# Patient Record
(demographics unavailable — no encounter records)

---

## 2024-10-24 NOTE — ASSESSMENT
[FreeTextEntry1] : Kayy is a 5-year-old previously healthy female presenting for hospital follow-up after being admitted to [facility] from 8/6 to 8/10 for respiratory failure in the setting of pneumonia and rhinovirus/enterovirus infection. She was treated with amoxicillin, azithromycin, and prednisolone during her hospitalization. She was also prescribed Fluticasone Propionate 44 mcg 2 puffs BID, which was continued for a month post-hospitalization and then discontinued.  At today's visit, her cough has completely resolved, and her lung exam and oxygen saturation were normal. The mother is unsure of the specific details of the pneumonia but will bring records from the outside hospital at the next visit.  I discussed with the mother that, given the absence of risk factors such as a family history of asthma, eczema, or allergies, as well as no prior history of cough, wheezing, or exercise-related symptoms, it is unlikely that her hospitalization was related to underlying asthma. Her respiratory distress at that time was likely related to viral bronchiolitis. Rhino/enterovirus is known to cause airway reactivity in some patients, and if she develops cough or wheezing requiring frequent albuterol use, she can restart Fluticasone Propionate. However, in the absence of symptoms, it is not indicated at this time. I recommended obtaining a repeat chest X-ray to follow up on her recent pneumonia.   PLAN:  - Reviewed indications for albuterol use - If patient requires albuterol frequently, restart Fluticasone Propionate for the winter season - Repeat Chest Xray - F/U as needed - Mother to have pediatrician's office fax over xray results from outside hospital.   Discussed above assessment, management plan, potential medication side effects and test results. Parent agreed with plan. All queries were answered. Patients evaluation include normal saturation. Time excludes separately reported services.

## 2024-10-24 NOTE — HISTORY OF PRESENT ILLNESS
[FreeTextEntry1] : Kayy is 5-year-old previously healthy female presenting for hospital follow-up after being admitted to [facility] from  to 8/10 for respiratory failure in the setting of pneumonia. She required oxygen supplementation during her hospitalization. Respiratory viral panel (RVP) was positive for rhinovirus/enterovirus. She was treated with amoxicillin and azithromycin, and albuterol was administered during the hospitalization with noted improvement in her respiratory status. She was also prescribed a 5 day course of prednisolone. Fluticasone Propionate 44 mcg 2 puffs BID was prescribed, which was discontinued after one month. Asthma action plan was also provided.   Follow-up to assess her current respiratory status and recovery post-hospitalization.  She was sick with URI symptoms 3 weeks ago. her cough has completely resolved now.   No repeat imaging has been done.  No family h/o asthma, allergies , eczema. Brother has environmental allergies  No prior diagnosis of asthma  No pets at home.  No smokers.  No prior hospitalization.  H/o albuterol use few years ago only for a respiratory infection one time.    Born FT, , in [place] ( [facility]) No respiratory complications at birth

## 2024-10-24 NOTE — END OF VISIT
[] : Fellow [Time Spent: ___ minutes] : I have spent [unfilled] minutes of time on the encounter which excludes teaching and separately reported services. [FreeTextEntry3] : I have seen and examined the patient. I have reviewed the history with Dr. Solis. No risk factors for asthma.  No confounders of airway disease such as sleep disorder breathing, JOSE or persistent postnasal drip. Discussed likely viral bronchiolitis that has resolved. Previous pneumonia likely treated as Mycoplasma pneumonia; we do not have copies of the CXRs. Discussed repeat CXR as "baseline", indications for albuterol and for that matter, inhaled steroids that likely can be given intermittently such that frequent use will inform 1) decision for daily use like for example the wintertime, 2) follow up in PUlmonary, 3) follow trajectory towards asthma.

## 2024-10-24 NOTE — HISTORY OF PRESENT ILLNESS
[FreeTextEntry1] : Kayy is 5-year-old previously healthy female presenting for hospital follow-up after being admitted to [facility] from  to 8/10 for respiratory failure in the setting of pneumonia. She required oxygen supplementation during her hospitalization. Respiratory viral panel (RVP) was positive for rhinovirus/enterovirus. She was treated with amoxicillin and azithromycin, and albuterol was administered during the hospitalization with noted improvement in her respiratory status. She was also prescribed a 5 day course of prednisolone. Fluticasone Propionate 44 mcg 2 puffs BID was prescribed, which was discontinued after one month. Asthma action plan was also provided.   Follow-up to assess her current respiratory status and recovery post-hospitalization.  She was sick with URI symptoms 3 weeks ago. her cough has completely resolved now.   No repeat imaging has been done.  No family h/o asthma, allergies , eczema. Brother has environmental allergies  No prior diagnosis of asthma  No pets at home.  No smokers.  No prior hospitalization.  H/o albuterol use few years ago only for a respiratory infection one time.    Born FT, , in [place] ( [facility]) No respiratory complications at birth

## 2024-10-24 NOTE — REASON FOR VISIT
[Initial Consultation] : an initial consultation for [F/U - Hospitalization] : follow-up of a recent hospitalization for [Mother] : mother [FreeTextEntry3] : + pneumonia

## 2024-10-24 NOTE — REASON FOR VISIT
Patient Name: Bernardo Fair   MRN: [id number]    Rupert Forbes is a 21 y.o. female who presents with the following: here with mother. Patient states that she has had recurrent falls over the past month due to leg weakness and numbness. States that symptoms began about a month ago when she woke up and could not feel her legs moving. Subsequently went to the ER where they did a CT scan of her head that showed possible colloid cyst and was told that it may be a brain tumor. She subsequently saw Dr. Francesca Suggs who did an MRI of the brain which was normal and prior finding was thought to be due to an artifact. She has also undergone an extensive neurological evaluation with Dr. Chay Echevarria including a normal EMG. She continues to have almost daily falls due to sudden weakness of her legs. Denies any preceding symptoms such as lightheadedness, chest pain, palpitations, vision changes. She does have a history of back surgery as a child but has not seen a spine orthopedic since becoming an adult. Not currently driving. She continues to have frequent migraines and believes that the Imitrex is no longer working for her but is taking frequent doses of ibuprofen. She does have a history of hypothyroidism but is not very compliant with her levothyroxine to 50 mg daily. Has consistently been taking her meds for the past month. Mother states that she was under stress about 5 years ago and was diagnosed with conversion disorder by a psychiatrist.  She currently denies any increased stress recently and has been doing well from an anxiety standpoint on her Cymbalta dose. Review of Systems   Constitutional: Negative for fever, malaise/fatigue and weight loss. Respiratory: Negative for cough, hemoptysis, shortness of breath and wheezing. Cardiovascular: Negative for chest pain, palpitations, leg swelling and PND.    Gastrointestinal: Negative for abdominal pain, constipation, diarrhea, nausea and vomiting. Musculoskeletal: Positive for falls. Neurological: Positive for tingling, sensory change and headaches. Negative for tremors, speech change, seizures and loss of consciousness. The patient's medications, allergies, past medical history, surgical history, family history and social history were reviewed and updated where appropriate. Prior to Admission medications    Medication Sig Start Date End Date Taking? Authorizing Provider   SUMAtriptan (IMITREX) 25 mg tablet Take 1 Tab by mouth as needed for Migraine for up to 1 dose. 2/26/19  Yes Jerrod Rivera MD   levothyroxine (SYNTHROID) 200 mcg tablet TAKE 1 TABLET BY MOUTH ONCE DAILY ALONG WITH 50 MCG LEVOTHYROXINE TABLET 1/31/19  Yes Vasu Chavez MD   DULoxetine (CYMBALTA) 60 mg capsule TAKE ONE CAPSULE BY MOUTH DAILY 5/4/18  Yes Jerrod Rivera MD   montelukast (SINGULAIR) 10 mg tablet TAKE ONE TABLET BY MOUTH DAILY 4/5/18  Yes Jerrod Rivera MD   levothyroxine (SYNTHROID) 50 mcg tablet TAKE ONE TABLET BY MOUTH ONCE DAILY ALONG WITH 200 MCG TABLET 2/12/18  Yes Jerrod Rivera MD   NUVARING 0.12-0.015 mg/24 hr vaginal ring INSERT VAGINALY X 3 WEEKS, REMOVE FOR 1 WEEK, THEN REPEAT 6/8/17  Yes Provider, Historical   cholecalciferol, vitamin D3, (VITAMIN D3) 2,000 unit tab Take 2,000 Units by mouth daily. Yes Provider, Historical   FERROUS FUMARATE (IRON PO) Take  by mouth daily. Yes Provider, Historical   EPIPEN 2-JOSE 0.3 mg/0.3 mL injection  5/6/16  Yes Provider, Historical   fexofenadine (ALLEGRA) 180 mg tablet Take 180 mg by mouth daily as needed.    Yes Provider, Historical       Allergies   Allergen Reactions    Hazelnut Anaphylaxis    Adhesive Tape-Silicones Itching     Paper tape    [place] Swelling    Grass Pollen Unknown (comments)     + testing           OBJECTIVE    Visit Vitals  /78 (BP 1 Location: Left arm, BP Patient Position: Sitting)   Pulse 95   Temp 98.4 °F (36.9 °C) (Oral)   Resp 18   Ht 5' 7.5\" (1.715 m)   Wt 329 lb 6.4 oz (149.4 kg)   LMP 03/12/2019 (Exact Date)   SpO2 98%   BMI 50.83 kg/m²       Physical Exam   Constitutional: She is oriented to person, place, and time and well-developed, well-nourished, and in no distress. No distress. Eyes: Conjunctivae and EOM are normal. Pupils are equal, round, and reactive to light. Neck: Normal range of motion. Neck supple. No thyromegaly present. Cardiovascular: Normal rate, regular rhythm and normal heart sounds. Exam reveals no gallop and no friction rub. No murmur heard. Pulmonary/Chest: Effort normal and breath sounds normal. No respiratory distress. She has no wheezes. Neurological: She is alert and oriented to person, place, and time. Skin: Skin is warm and dry. No rash noted. She is not diaphoretic. Psychiatric: Mood, memory, affect and judgment normal.   Nursing note and vitals reviewed. ASSESSMENT AND PLAN  Mason Lea is a 21 y.o. female who presents today for:    1. Acquired hypothyroidism  Stable, continue current treatment pending review of labs. Strongly encouraged her to be compliant with her medications as uncontrolled thyroid disorders may contribute to neuropathy.  - TSH AND FREE T4  - T3, FREE  - THYROID ANTIBODY PANEL    2. Vitamin D deficiency  Stable, continue current treatment pending review of labs. - VITAMIN D, 25 HYDROXY    3. Neuropathy  Recent EMG was within normal limits. Discussed that she should have her back reevaluated given her history of back surgery and now new symptoms of bilateral leg weakness. May consider an MRI of the lumbar spine but will defer any additional testing to orthopedics. Offered PT due to recurrent falls but patient declined. Also discussed with her that there may be a component of conversion disorder/fibromyalgia if workup continues to be unremarkable. - CK  - METABOLIC PANEL, COMPREHENSIVE  - CBC W/O DIFF  - VITAMIN B12 & FOLATE  - REFERRAL TO ORTHOPEDIC SURGERY    4. History of back surgery  - REFERRAL TO ORTHOPEDIC SURGERY    5. Other migraine without status migrainosus, not intractable  Would switch Imitrex to Maxalt for as needed therapy and start riboflavin for prophylactic therapy. - riboflavin, vitamin B2, 400 mg tab; Take 400 mg by mouth daily. Dispense: 90 Tab; Refill: 0  - rizatriptan (MAXALT) 10 mg tablet; Take 1 Tab by mouth once as needed for Migraine for up to 1 dose. May repeat in 2 hours if needed; do not take more than 2 tabs in 24 hours  Dispense: 10 Tab; Refill: 0    6. Morbid obesity with BMI of 45.0-49.9, adult ([facility] [address]  I have reviewed/discussed the above normal BMI with the patient. I have recommended the following interventions: dietary management education, guidance, and counseling, encourage exercise, monitor weight and prescribed dietary intake. Medications Discontinued During This Encounter   Medication Reason    oxyCODONE-acetaminophen (PERCOCET) 5-325 mg per tablet     ibuprofen (MOTRIN) 800 mg tablet     SUMAtriptan (IMITREX) 25 mg tablet        Follow-up Disposition:  Return in about 3 months (around 6/15/2019) for Medication Check. Time: 40 minutes was spent with this patient face to face discussing test results, follow up visits, and when repeat testing. I discussed diagnoses, risk factors and treatment for each based on current recommendations and literature. Greater than 50% of total visit time was spent in counseling and coordination of care. Medication risks/benefits/costs/interactions/alternatives discussed with patient. Advised patient to call back or return to office if symptoms worsen/change/persist. If patient cannot reach us or should anything more severe/urgent arise he/she should proceed directly to the nearest emergency department. Discussed expected course/resolution/complications of diagnosis in detail with patient.   Patient given a written after visit summary which includes his/her diagnoses, current medications and vitals. Patient expressed understanding with the diagnosis and plan. Luis Alfredo Santos M.D. [Initial Consultation] : an initial consultation for [F/U - Hospitalization] : follow-up of a recent hospitalization for [Mother] : mother [FreeTextEntry3] : + pneumonia

## 2024-10-24 NOTE — ASSESSMENT
[FreeTextEntry1] : Kayy is a 5-year-old previously healthy female presenting for hospital follow-up after being admitted to [facility] from 8/6 to 8/10 for respiratory failure in the setting of pneumonia and rhinovirus/enterovirus infection. She was treated with amoxicillin, azithromycin, and prednisolone during her hospitalization. She was also prescribed Fluticasone Propionate 44 mcg 2 puffs BID, which was continued for a month post-hospitalization and then discontinued.  At today's visit, her cough has completely resolved, and her lung exam and oxygen saturation were normal. The mother is unsure of the specific details of the pneumonia but will bring records from the outside hospital at the next visit.  I discussed with the mother that, given the absence of risk factors such as a family history of asthma, eczema, or allergies, as well as no prior history of cough, wheezing, or exercise-related symptoms, it is unlikely that her hospitalization was related to underlying asthma. Her respiratory distress at that time was likely related to viral bronchiolitis. Rhino/enterovirus is known to cause airway reactivity in some patients, and if she develops cough or wheezing requiring frequent albuterol use, she can restart Fluticasone Propionate. However, in the absence of symptoms, it is not indicated at this time. I recommended obtaining a repeat chest X-ray to follow up on her recent pneumonia.   PLAN:  - Reviewed indications for albuterol use - If patient requires albuterol frequently, restart Fluticasone Propionate for the winter season - Repeat Chest Xray - F/U as needed - Mother to have pediatrician's office fax over xray results from outside hospital.   Discussed above assessment, management plan, potential medication side effects and test results. Parent agreed with plan. All queries were answered. Patients evaluation include normal saturation. Time excludes separately reported services.

## 2024-10-24 NOTE — ASSESSMENT
[FreeTextEntry1] : Kayy is a 5-year-old previously healthy female presenting for hospital follow-up after being admitted to [facility] from 8/6 to 8/10 for respiratory failure in the setting of pneumonia and rhinovirus/enterovirus infection. She was treated with amoxicillin, azithromycin, and prednisolone during her hospitalization. She was also prescribed Fluticasone Propionate 44 mcg 2 puffs BID, which was continued for a month post-hospitalization and then discontinued.  At today's visit, her cough has completely resolved, and her lung exam and oxygen saturation were normal. The mother is unsure of the specific details of the pneumonia but will bring records from the outside hospital at the next visit.  I discussed with the mother that, given the absence of risk factors such as a family history of asthma, eczema, or allergies, as well as no prior history of cough, wheezing, or exercise-related symptoms, it is unlikely that her hospitalization was related to underlying asthma. Her respiratory distress at that time was likely related to viral bronchiolitis. Rhino/enterovirus is known to cause airway reactivity in some patients, and if she develops cough or wheezing requiring frequent albuterol use, she can restart Fluticasone Propionate. However, in the absence of symptoms, it is not indicated at this time. I recommended obtaining a repeat chest X-ray to follow up on her recent pneumonia.   PLAN:  - Reviewed indications for albuterol use - If patient requires albuterol frequently, restart Fluticasone Propionate for the winter season - Repeat Chest Xray - F/U as needed - Mother to have pediatrician's office fax over xray results from outside hospital.   Discussed above assessment, management plan, potential medication side effects and test results. Parent agreed with plan. All queries were answered. Patients evaluation include normal saturation. Time excludes separately reported services.

## 2024-10-24 NOTE — HISTORY OF PRESENT ILLNESS
[FreeTextEntry1] : Kayy is 5-year-old previously healthy female presenting for hospital follow-up after being admitted to [facility] from  to 8/10 for respiratory failure in the setting of pneumonia. She required oxygen supplementation during her hospitalization. Respiratory viral panel (RVP) was positive for rhinovirus/enterovirus. She was treated with amoxicillin and azithromycin, and albuterol was administered during the hospitalization with noted improvement in her respiratory status. She was also prescribed a 5 day course of prednisolone. Fluticasone Propionate 44 mcg 2 puffs BID was prescribed, which was discontinued after one month. Asthma action plan was also provided.   Follow-up to assess her current respiratory status and recovery post-hospitalization.  She was sick with URI symptoms 3 weeks ago. her cough has completely resolved now.   No repeat imaging has been done.  No family h/o asthma, allergies , eczema. Brother has environmental allergies  No prior diagnosis of asthma  No pets at home.  No smokers.  No prior hospitalization.  H/o albuterol use few years ago only for a respiratory infection one time.    Born FT, , in [place] ( [facility]) No respiratory complications at birth

## 2024-10-24 NOTE — DATA REVIEWED
[FreeTextEntry1] : Physical copy of discharge paperwork from outside hospital. No imaging record was provided.

## 2024-10-24 NOTE — END OF VISIT
[] : Fellow [Time Spent: ___ minutes] : I have spent [unfilled] minutes of time on the encounter which excludes teaching and separately reported services. [FreeTextEntry3] : I have seen and examined the patient. I have reviewed the history with Dr. Solis. No risk factors for asthma.  No confounders of airway disease such as sleep disorder breathing, OJSE or persistent postnasal drip. Discussed likely viral bronchiolitis that has resolved. Previous pneumonia likely treated as Mycoplasma pneumonia; we do not have copies of the CXRs. Discussed repeat CXR as "baseline", indications for albuterol and for that matter, inhaled steroids that likely can be given intermittently such that frequent use will inform 1) decision for daily use like for example the wintertime, 2) follow up in PUlmonary, 3) follow trajectory towards asthma.

## 2024-10-24 NOTE — ASSESSMENT
[FreeTextEntry1] : Kayy is a 5-year-old previously healthy female presenting for hospital follow-up after being admitted to [facility] from 8/6 to 8/10 for respiratory failure in the setting of pneumonia and rhinovirus/enterovirus infection. She was treated with amoxicillin, azithromycin, and prednisolone during her hospitalization. She was also prescribed Fluticasone Propionate 44 mcg 2 puffs BID, which was continued for a month post-hospitalization and then discontinued.  At today's visit, her cough has completely resolved, and her lung exam and oxygen saturation were normal. The mother is unsure of the specific details of the pneumonia but will bring records from the outside hospital at the next visit.  I discussed with the mother that, given the absence of risk factors such as a family history of asthma, eczema, or allergies, as well as no prior history of cough, wheezing, or exercise-related symptoms, it is unlikely that her hospitalization was related to underlying asthma. Her respiratory distress at that time was likely related to viral bronchiolitis. Rhino/enterovirus is known to cause airway reactivity in some patients, and if she develops cough or wheezing requiring frequent albuterol use, she can restart Fluticasone Propionate. However, in the absence of symptoms, it is not indicated at this time. I recommended obtaining a repeat chest X-ray to follow up on her recent pneumonia.   PLAN:  - Reviewed indications for albuterol use - If patient requires albuterol frequently, restart Fluticasone Propionate for the winter season - Repeat Chest Xray - F/U as needed - Mother to have pediatrician's office fax over xray results from outside hospital.   Discussed above assessment, management plan, potential medication side effects and test results. Parent agreed with plan. All queries were answered. Patients evaluation include normal saturation. Time excludes separately reported services.

## 2024-10-24 NOTE — HISTORY OF PRESENT ILLNESS
[FreeTextEntry1] : Kayy is 5-year-old previously healthy female presenting for hospital follow-up after being admitted to [facility] from  to 8/10 for respiratory failure in the setting of pneumonia. She required oxygen supplementation during her hospitalization. Respiratory viral panel (RVP) was positive for rhinovirus/enterovirus. She was treated with amoxicillin and azithromycin, and albuterol was administered during the hospitalization with noted improvement in her respiratory status. She was also prescribed a 5 day course of prednisolone. Fluticasone Propionate 44 mcg 2 puffs BID was prescribed, which was discontinued after one month. Asthma action plan was also provided.   Follow-up to assess her current respiratory status and recovery post-hospitalization.  She was sick with URI symptoms 3 weeks ago. her cough has completely resolved now.   No repeat imaging has been done.  No family h/o asthma, allergies , eczema. Brother has environmental allergies  No prior diagnosis of asthma  No pets at home.  No smokers.  No prior hospitalization.  H/o albuterol use few years ago only for a respiratory infection one time.    Born FT, , in [place] ( [facility]) No respiratory complications at birth

## 2024-10-24 NOTE — REVIEW OF SYSTEMS
[NI] : Genitourinary  [Nl] : Endocrine [Pneumonia] : pneumonia [Frequent URIs] : no frequent upper respiratory infections [Snoring] : no snoring [Apnea] : no apnea [Postnasl Drip] : no postnasal drip [Wheezing] : no wheezing [Cough] : no cough [Reflux] : no reflux